# Patient Record
Sex: MALE | Race: WHITE | NOT HISPANIC OR LATINO | Employment: OTHER | URBAN - METROPOLITAN AREA
[De-identification: names, ages, dates, MRNs, and addresses within clinical notes are randomized per-mention and may not be internally consistent; named-entity substitution may affect disease eponyms.]

---

## 2022-09-18 ENCOUNTER — APPOINTMENT (OUTPATIENT)
Dept: RADIOLOGY | Facility: HOSPITAL | Age: 72
End: 2022-09-18
Payer: MEDICARE

## 2022-09-18 ENCOUNTER — HOSPITAL ENCOUNTER (EMERGENCY)
Facility: HOSPITAL | Age: 72
Discharge: HOME | End: 2022-09-18
Attending: EMERGENCY MEDICINE
Payer: MEDICARE

## 2022-09-18 VITALS
HEART RATE: 50 BPM | HEIGHT: 72 IN | BODY MASS INDEX: 23.03 KG/M2 | DIASTOLIC BLOOD PRESSURE: 63 MMHG | TEMPERATURE: 98 F | RESPIRATION RATE: 18 BRPM | OXYGEN SATURATION: 97 % | WEIGHT: 170 LBS | SYSTOLIC BLOOD PRESSURE: 135 MMHG

## 2022-09-18 DIAGNOSIS — S61.310A: Primary | ICD-10-CM

## 2022-09-18 DIAGNOSIS — S62.630B OPEN FRACTURE OF DISTAL PHALANX OF RIGHT INDEX FINGER: ICD-10-CM

## 2022-09-18 PROCEDURE — 99284 EMERGENCY DEPT VISIT MOD MDM: CPT

## 2022-09-18 PROCEDURE — 99283 EMERGENCY DEPT VISIT LOW MDM: CPT

## 2022-09-18 PROCEDURE — 12001 RPR S/N/AX/GEN/TRNK 2.5CM/<: CPT

## 2022-09-18 PROCEDURE — 90471 IMMUNIZATION ADMIN: CPT

## 2022-09-18 PROCEDURE — 73140 X-RAY EXAM OF FINGER(S): CPT

## 2022-09-18 PROCEDURE — 90715 TDAP VACCINE 7 YRS/> IM: CPT

## 2022-09-18 RX ORDER — CEPHALEXIN 500 MG/1
500 CAPSULE ORAL EVERY 6 HOURS SCHEDULED
Qty: 28 CAPSULE | Refills: 0 | Status: SHIPPED | OUTPATIENT
Start: 2022-09-18 | End: 2022-09-25

## 2022-09-18 RX ORDER — LIDOCAINE HYDROCHLORIDE 10 MG/ML
5 INJECTION, SOLUTION EPIDURAL; INFILTRATION; INTRACAUDAL; PERINEURAL ONCE
Status: COMPLETED | OUTPATIENT
Start: 2022-09-18 | End: 2022-09-18

## 2022-09-18 RX ORDER — GINSENG 100 MG
1 CAPSULE ORAL ONCE
Status: COMPLETED | OUTPATIENT
Start: 2022-09-18 | End: 2022-09-18

## 2022-09-18 RX ADMIN — TETANUS TOXOID, REDUCED DIPHTHERIA TOXOID AND ACELLULAR PERTUSSIS VACCINE, ADSORBED 0.5 ML: 5; 2.5; 8; 8; 2.5 SUSPENSION INTRAMUSCULAR at 16:21

## 2022-09-18 RX ADMIN — LIDOCAINE HYDROCHLORIDE 5 ML: 10 INJECTION, SOLUTION EPIDURAL; INFILTRATION; INTRACAUDAL; PERINEURAL at 15:56

## 2022-09-18 RX ADMIN — BACITRACIN ZINC 1 SMALL APPLICATION: 500 OINTMENT TOPICAL at 18:12

## 2022-09-18 NOTE — ED PROVIDER NOTES
History  Chief Complaint   Patient presents with    Finger Injury     R index finger lac from circular saw, unknown tetanus      Patient is a 77-year-old male presented to the emergency department for a laceration of his right index finger 1 hour ago  Reports he went to urgent care for evaluation and was told to proceed to the ER  Reports the bleeding is controlled with gauze and pressure  Denies fevers, chills, rash, headache, weakness, dizziness, visual changes, abdominal pain, nausea, vomiting, diarrhea, constipation, chest pain, shortness of breath or difficulty breathing  Does not offer any other concerns or complaints  Finger Laceration  Location:  Finger  Finger laceration location:  R index finger  Length:  Distal tip  Depth: Through underlying tissue  Quality: stellate    Bleeding: controlled    Time since incident:  1 hour  Injury mechanism: circular saw  Pain details:     Quality:  Aching  Foreign body present:  No foreign bodies  Relieved by:  Pressure  Worsened by: Movement  Ineffective treatments:  None tried  Tetanus status:  Unknown  Associated symptoms: no fever, no focal weakness, no numbness, no rash, no redness, no swelling and no streaking        None       Past Medical History:   Diagnosis Date    Diabetes mellitus ([facility] [address] )     Hypertension        No past surgical history on file  No family history on file  I have reviewed and agree with the history as documented  E-Cigarette/Vaping     E-Cigarette/Vaping Substances          Review of Systems   Constitutional: Negative for chills and fever  HENT: Negative for ear pain and sore throat  Eyes: Negative for pain and visual disturbance  Respiratory: Negative for cough and shortness of breath  Cardiovascular: Negative for chest pain and palpitations  Gastrointestinal: Negative for abdominal pain and vomiting  Genitourinary: Negative for dysuria and hematuria  Musculoskeletal: Negative for arthralgias and back pain  Skin: Negative for color change and rash  Laceration of the distal right index finger- the tip of the finger is almost completely avulsed off  Neurological: Negative for focal weakness, seizures and syncope  All other systems reviewed and are negative  Physical Exam  Physical Exam  Vitals and nursing note reviewed  Constitutional:       General: He is not in acute distress  Appearance: Normal appearance  He is not ill-appearing, toxic-appearing or diaphoretic  HENT:      Head: Normocephalic and atraumatic  Right Ear: External ear normal       Left Ear: External ear normal       Nose: Nose normal       Mouth/Throat:      Mouth: Mucous membranes are moist    Eyes:      General: No scleral icterus  Right eye: No discharge  Left eye: No discharge  Conjunctiva/sclera: Conjunctivae normal    Cardiovascular:      Rate and Rhythm: Normal rate  Pulmonary:      Effort: Pulmonary effort is normal  No respiratory distress  Musculoskeletal:         General: No swelling, deformity or signs of injury  Normal range of motion  Right hand: Laceration and tenderness present  Normal capillary refill  Left hand: Normal         Hands:       Cervical back: Normal range of motion and neck supple  No rigidity  Comments: The distal phalanx of the right index finger is almost completely avulsed off  The medial aspect of the skin is the only intact portion  There is missing nail  Skin:     General: Skin is warm and dry  Coloration: Skin is not jaundiced  Findings: No erythema or rash  Neurological:      General: No focal deficit present  Mental Status: He is alert and oriented to person, place, and time  Mental status is at baseline  Cranial Nerves: No cranial nerve deficit  Gait: Gait normal    Psychiatric:         Mood and Affect: Mood normal          Behavior: Behavior normal          Thought Content:  Thought content normal  Judgment: Judgment normal          Vital Signs  ED Triage Vitals [09/18/22 1517]   Temperature Pulse Respirations Blood Pressure SpO2   98 °F (36 7 °C) (!) 50 18 135/63 97 %      Temp src Heart Rate Source Patient Position - Orthostatic VS BP Location FiO2 (%)   -- -- -- -- --      Pain Score       --           Vitals:    09/18/22 1517   BP: 135/63   Pulse: (!) 50         Visual Acuity      ED Medications  Medications   lidocaine (PF) (XYLOCAINE-MPF) 1 % injection 5 mL (5 mL Infiltration Given by Other 9/18/22 1556)   tetanus-diphtheria-acellular pertussis (BOOSTRIX) IM injection 0 5 mL (0 5 mL Intramuscular Given 9/18/22 1621)   bacitracin topical ointment 1 small application (1 small application Topical Given by Other 9/18/22 1812)       Diagnostic Studies  Results Reviewed     None                 XR finger second digit-index RIGHT    (Results Pending)              Procedures  Laceration repair    Date/Time: 9/18/2022 5:30 PM  Performed by: Isaac Azevedo PA-C  Authorized by: Isaac Azevedo PA-C   Consent: Verbal consent obtained  Risks and benefits: risks, benefits and alternatives were discussed  Consent given by: patient  Patient understanding: patient states understanding of the procedure being performed  Patient consent: the patient's understanding of the procedure matches consent given  Procedure consent: procedure consent matches procedure scheduled  Patient identity confirmed: verbally with patient and arm band  Time out: Immediately prior to procedure a "time out" was called to verify the correct patient, procedure, equipment, support staff and site/side marked as required    Body area: upper extremity  Location details: right index finger  Laceration length: 1 5 (almost complete circumferential laceration) cm  Foreign bodies: no foreign bodies  Anesthesia: digital block    Anesthesia:  Local Anesthetic: lidocaine 1% without epinephrine  Anesthetic total: 2 mL    Sedation:  Patient sedated: no      Wound Dehiscence:  Superficial Wound Dehiscence: simple closure      Procedure Details:  Preparation: Patient was prepped and draped in the usual sterile fashion  Irrigation solution: saline  Irrigation method: jet lavage  Amount of cleaning: extensive  Debridement: none  Degree of undermining: none  Skin closure: 4-0 nylon  Number of sutures: 4  Technique: simple  Approximation: close  Approximation difficulty: simple  Dressing: 4x4 sterile gauze, gauze roll, splint and antibiotic ointment  Patient tolerance: patient tolerated the procedure well with no immediate complications  Comments: 4 sutures placed on the lateral and palmar surfaces of the finger  No closure where the nail is no longer present, wound is open on the dorsal aspect               ED Course  ED Course as of 09/18/22 1836   Sun Sep 18, 2022   3948 Discussed the case with Dr Cinthya Morfin, agrees with outpatient hand surgery follow up with 7 days of keflex 4 times a day  Splint for comfort               MDM  Number of Diagnoses or Management Options  Laceration of right index finger with damage to nail: new and requires workup  Open fracture of distal phalanx of right index finger: new and requires workup  Diagnosis management comments: This is a 68-year-old male presented to the emergency department for a laceration of his right index finger 1 hour ago  Reports he went to urgent care for evaluation and was told to proceed to the ER  Reports the bleeding is controlled with gauze and pressure  Denies fevers, chills, rash, headache, weakness, dizziness, visual changes, abdominal pain, nausea, vomiting, diarrhea, constipation, chest pain, shortness of breath or difficulty breathing  Differential diagnosis to include but is not limited to: laceration, fracture, dislocation, foreign body       Initial ED Plan: xray right index finger, irrigation, suture     ED results: open fracture of the tip of the right distal phalanx    Final ED assessment: Patient is stable and well appearing  Discussed radiologic studies  Discussed follow up with PCP  Discussed follow up with orthopedic hand surgery  Discussed suture care and removal in 5 days  Discussed antibiotic course  Strict return precautions were discussed including but not limited to pain, swelling, redness, fevers, discharge  Patient verbalized understanding and is agreeable with the plan for discharge  Amount and/or Complexity of Data Reviewed  Tests in the radiology section of CPT®: ordered and reviewed  Independent visualization of images, tracings, or specimens: yes        Disposition  Final diagnoses:   Laceration of right index finger with damage to nail   Open fracture of distal phalanx of right index finger     Time reflects when diagnosis was documented in both MDM as applicable and the Disposition within this note     Time User Action Codes Description Comment    9/18/2022  5:50 PM Joanne Gibbs Add [V48 019Y] Laceration of right index finger with damage to nail     9/18/2022  5:52 PM Joanne Gibbs Add [[id number] [id number]] Open fracture of distal phalanx of right index finger       ED Disposition     ED Disposition   Discharge    Condition   Stable    Date/Time   Sun Sep 18, 2022  5:49 PM    Comment   Pili Ates discharge to home/self care  Follow-up Information    None         Patient's Medications   Discharge Prescriptions    CEPHALEXIN (KEFLEX) 500 MG CAPSULE    Take 1 capsule (500 mg total) by mouth every 6 (six) hours for 7 days       Start Date: 9/18/2022 End Date: 9/25/2022       Order Dose: 500 mg       Quantity: 28 capsule    Refills: 0       No discharge procedures on file      PDMP Review     None          ED Provider  Electronically Signed by           Finn Carmen PA-C  09/18/22 5697

## 2022-09-18 NOTE — DISCHARGE INSTRUCTIONS
Follow up with PCP  Follow up with hand surgery  Tylenol/motrin as needed for pain  Keflex 4 times a day for 1 week  Return to the ED with new or worsening symptoms including but not limited to pain, swelling, redness, discharge, fevers

## 2022-09-18 NOTE — ED NOTES
ED physician at bedside splinting and putting dressing on finger     Reather ADRIENNE Goodson  09/18/22 7792